# Patient Record
Sex: FEMALE | Race: WHITE | NOT HISPANIC OR LATINO | URBAN - METROPOLITAN AREA
[De-identification: names, ages, dates, MRNs, and addresses within clinical notes are randomized per-mention and may not be internally consistent; named-entity substitution may affect disease eponyms.]

---

## 2017-02-09 PROBLEM — 721703004 FIRST DEGREE HEMORRHOIDS: Status: ACTIVE | Noted: 2017-02-09

## 2017-02-09 PROBLEM — 92076000 BENIGN NEOPLASM OF DESCENDING COLON: Status: ACTIVE | Noted: 2017-02-09

## 2017-02-09 PROBLEM — 16331000 HEARTBURN: Status: ACTIVE | Noted: 2017-02-09

## 2017-02-09 PROBLEM — 92448001 BENIGN NEOPLASM OF TRANSVERSE COLON: Status: ACTIVE | Noted: 2017-02-09

## 2017-02-09 PROBLEM — 442076002 EARLY SATIETY: Status: ACTIVE | Noted: 2017-02-09

## 2017-02-09 PROBLEM — 398050005 DIVERTICULAR DISEASE OF COLON: Status: ACTIVE | Noted: 2017-02-09

## 2017-02-09 PROBLEM — 39772007 RECTAL POLYP: Status: ACTIVE | Noted: 2017-02-09

## 2017-02-09 PROBLEM — 40739000 DYSPHAGIA: Status: ACTIVE | Noted: 2017-02-09

## 2017-02-09 PROBLEM — 422587007 NAUSEA: Status: ACTIVE | Noted: 2017-02-09

## 2017-02-09 PROBLEM — 68496003 POLYP OF COLON: Status: ACTIVE | Noted: 2017-02-09

## 2022-05-26 ENCOUNTER — WEB ENCOUNTER (OUTPATIENT)
Dept: URBAN - METROPOLITAN AREA CLINIC 23 | Facility: CLINIC | Age: 67
End: 2022-05-26

## 2022-05-26 ENCOUNTER — OFFICE VISIT (OUTPATIENT)
Dept: URBAN - METROPOLITAN AREA CLINIC 23 | Facility: CLINIC | Age: 67
End: 2022-05-26
Payer: COMMERCIAL

## 2022-05-26 ENCOUNTER — DASHBOARD ENCOUNTERS (OUTPATIENT)
Age: 67
End: 2022-05-26

## 2022-05-26 VITALS — BODY MASS INDEX: 40.49 KG/M2 | TEMPERATURE: 97.3 F | WEIGHT: 237.2 LBS | HEART RATE: 64 BPM | HEIGHT: 64 IN

## 2022-05-26 DIAGNOSIS — Z12.11 COLON CANCER SCREENING: ICD-10-CM

## 2022-05-26 PROCEDURE — 99202 OFFICE O/P NEW SF 15 MIN: CPT | Performed by: INTERNAL MEDICINE

## 2022-05-26 PROCEDURE — 99242 OFF/OP CONSLTJ NEW/EST SF 20: CPT | Performed by: INTERNAL MEDICINE

## 2022-05-26 RX ORDER — ALBUTEROL SULFATE 90 UG/1
2 PUFFS AS NEEDED AEROSOL, METERED RESPIRATORY (INHALATION)
Status: ON HOLD | COMMUNITY

## 2022-05-26 RX ORDER — ATORVASTATIN CALCIUM 40 MG/1
1 TABLET TABLET, FILM COATED ORAL ONCE A DAY
Status: ACTIVE | COMMUNITY

## 2022-05-26 RX ORDER — AMITRIPTYLINE HYDROCHLORIDE 25 MG/1
3 TABLET TABLET, FILM COATED ORAL ONCE A DAY
Status: ON HOLD | COMMUNITY

## 2022-05-26 RX ORDER — GUARN/MA-HUANG/P.GIN/S.GINSENG
TABLET ORAL
Status: ON HOLD | COMMUNITY

## 2022-05-26 RX ORDER — ATOGEPANT 60 MG/1
1 TABLET TABLET ORAL ONCE A DAY
Status: ACTIVE | COMMUNITY

## 2022-05-26 RX ORDER — CALCIUM CARBONATE 500 MG/1
1 TABLET TABLET ORAL
Status: ON HOLD | COMMUNITY

## 2022-05-26 RX ORDER — LISINOPRIL 10 MG/1
TABLET ORAL
Status: ACTIVE | COMMUNITY

## 2022-05-26 RX ORDER — RANITIDINE HYDROCHLORIDE 150 MG/1
1 CAPSULE AT BEDTIME CAPSULE ORAL TWICE A DAY
Qty: 180 CAPSULE | Refills: 0 | Status: ON HOLD | COMMUNITY
Start: 2017-05-03

## 2022-05-26 RX ORDER — LEVETIRACETAM 500 MG/1
TABLET ORAL
Status: ACTIVE | COMMUNITY

## 2022-05-26 RX ORDER — METFORMIN HYDROCHLORIDE 500 MG/1
1 TABLET WITH A MEAL TABLET, FILM COATED ORAL ONCE A DAY
Status: ACTIVE | COMMUNITY

## 2022-05-26 RX ORDER — PEDI MULTIVIT 22/VIT D3/VIT K 1000-800
TABLET,CHEWABLE ORAL
Status: ON HOLD | COMMUNITY

## 2022-05-26 RX ORDER — B-COMPLEX WITH VITAMIN C
TABLET ORAL
Status: ON HOLD | COMMUNITY

## 2022-05-26 NOTE — HPI-TODAY'S VISIT:
67F referred by Dr Massey for GI eval for CRC screening.  A copy of this document will be sent to the referring physician. Cards--Dr Dasilva . Last colon 2017--polyps incl adenomas removed  Never had colonoscopy. No family history of colon cancer Bowel movements every day. No blood in stool. No change in stool caliber.  Feels fine. No complaints

## 2022-07-21 ENCOUNTER — TELEPHONE ENCOUNTER (OUTPATIENT)
Dept: URBAN - METROPOLITAN AREA CLINIC 6 | Facility: CLINIC | Age: 67
End: 2022-07-21

## 2022-07-28 ENCOUNTER — OFFICE VISIT (OUTPATIENT)
Dept: URBAN - METROPOLITAN AREA SURGERY CENTER 8 | Facility: SURGERY CENTER | Age: 67
End: 2022-07-28

## 2022-08-16 ENCOUNTER — LAB OUTSIDE AN ENCOUNTER (OUTPATIENT)
Dept: URBAN - METROPOLITAN AREA CLINIC 23 | Facility: CLINIC | Age: 67
End: 2022-08-16

## 2022-08-16 ENCOUNTER — OFFICE VISIT (OUTPATIENT)
Dept: URBAN - METROPOLITAN AREA MEDICAL CENTER 27 | Facility: MEDICAL CENTER | Age: 67
End: 2022-08-16
Payer: COMMERCIAL

## 2022-08-16 DIAGNOSIS — Z86.010 ADENOMAS PERSONAL HISTORY OF COLONIC POLYPS: ICD-10-CM

## 2022-08-16 LAB
GLUCOSE POC: 93
PERFORMING LAB: (no result)

## 2022-08-16 PROCEDURE — G0105 COLORECTAL SCRN; HI RISK IND: HCPCS | Performed by: INTERNAL MEDICINE

## 2022-08-16 RX ORDER — B-COMPLEX WITH VITAMIN C
TABLET ORAL
Status: ON HOLD | COMMUNITY

## 2022-08-16 RX ORDER — RANITIDINE HYDROCHLORIDE 150 MG/1
1 CAPSULE AT BEDTIME CAPSULE ORAL TWICE A DAY
Qty: 180 CAPSULE | Refills: 0 | Status: ON HOLD | COMMUNITY
Start: 2017-05-03

## 2022-08-16 RX ORDER — PEDI MULTIVIT 22/VIT D3/VIT K 1000-800
TABLET,CHEWABLE ORAL
Status: ON HOLD | COMMUNITY

## 2022-08-16 RX ORDER — LEVETIRACETAM 500 MG/1
TABLET ORAL
Status: ACTIVE | COMMUNITY

## 2022-08-16 RX ORDER — AMITRIPTYLINE HYDROCHLORIDE 25 MG/1
3 TABLET TABLET, FILM COATED ORAL ONCE A DAY
Status: ON HOLD | COMMUNITY

## 2022-08-16 RX ORDER — METFORMIN HYDROCHLORIDE 500 MG/1
1 TABLET WITH A MEAL TABLET, FILM COATED ORAL ONCE A DAY
Status: ACTIVE | COMMUNITY

## 2022-08-16 RX ORDER — ATORVASTATIN CALCIUM 40 MG/1
1 TABLET TABLET, FILM COATED ORAL ONCE A DAY
Status: ACTIVE | COMMUNITY

## 2022-08-16 RX ORDER — ALBUTEROL SULFATE 90 UG/1
2 PUFFS AS NEEDED AEROSOL, METERED RESPIRATORY (INHALATION)
Status: ON HOLD | COMMUNITY

## 2022-08-16 RX ORDER — ATOGEPANT 60 MG/1
1 TABLET TABLET ORAL ONCE A DAY
Status: ACTIVE | COMMUNITY

## 2022-08-16 RX ORDER — GUARN/MA-HUANG/P.GIN/S.GINSENG
TABLET ORAL
Status: ON HOLD | COMMUNITY

## 2022-08-16 RX ORDER — CALCIUM CARBONATE 500 MG/1
1 TABLET TABLET ORAL
Status: ON HOLD | COMMUNITY

## 2022-08-16 RX ORDER — LISINOPRIL 10 MG/1
TABLET ORAL
Status: ACTIVE | COMMUNITY